# Patient Record
Sex: FEMALE | Race: WHITE | Employment: OTHER | URBAN - METROPOLITAN AREA
[De-identification: names, ages, dates, MRNs, and addresses within clinical notes are randomized per-mention and may not be internally consistent; named-entity substitution may affect disease eponyms.]

---

## 2022-10-13 ENCOUNTER — OFFICE VISIT (OUTPATIENT)
Dept: ORTHOPEDIC SURGERY | Age: 67
End: 2022-10-13
Payer: MEDICARE

## 2022-10-13 VITALS — BODY MASS INDEX: 24.34 KG/M2 | WEIGHT: 170 LBS | HEIGHT: 70 IN

## 2022-10-13 DIAGNOSIS — M77.8 RIGHT WRIST TENDINITIS: ICD-10-CM

## 2022-10-13 DIAGNOSIS — M25.531 RIGHT WRIST PAIN: Primary | ICD-10-CM

## 2022-10-13 PROCEDURE — G8420 CALC BMI NORM PARAMETERS: HCPCS | Performed by: FAMILY MEDICINE

## 2022-10-13 PROCEDURE — G8427 DOCREV CUR MEDS BY ELIG CLIN: HCPCS | Performed by: FAMILY MEDICINE

## 2022-10-13 PROCEDURE — 3017F COLORECTAL CA SCREEN DOC REV: CPT | Performed by: FAMILY MEDICINE

## 2022-10-13 PROCEDURE — 99203 OFFICE O/P NEW LOW 30 MIN: CPT | Performed by: FAMILY MEDICINE

## 2022-10-13 PROCEDURE — G8484 FLU IMMUNIZE NO ADMIN: HCPCS | Performed by: FAMILY MEDICINE

## 2022-10-13 PROCEDURE — 1123F ACP DISCUSS/DSCN MKR DOCD: CPT | Performed by: FAMILY MEDICINE

## 2022-10-13 PROCEDURE — 1090F PRES/ABSN URINE INCON ASSESS: CPT | Performed by: FAMILY MEDICINE

## 2022-10-13 PROCEDURE — G8400 PT W/DXA NO RESULTS DOC: HCPCS | Performed by: FAMILY MEDICINE

## 2022-10-13 PROCEDURE — 1036F TOBACCO NON-USER: CPT | Performed by: FAMILY MEDICINE

## 2022-10-13 PROCEDURE — 20550 NJX 1 TENDON SHEATH/LIGAMENT: CPT | Performed by: FAMILY MEDICINE

## 2022-10-13 RX ORDER — ATORVASTATIN CALCIUM 40 MG/1
40 TABLET, FILM COATED ORAL DAILY
COMMUNITY
Start: 2020-11-04

## 2022-10-13 RX ORDER — BETAMETHASONE SODIUM PHOSPHATE AND BETAMETHASONE ACETATE 3; 3 MG/ML; MG/ML
6 INJECTION, SUSPENSION INTRA-ARTICULAR; INTRALESIONAL; INTRAMUSCULAR; SOFT TISSUE ONCE
Status: COMPLETED | OUTPATIENT
Start: 2022-10-13 | End: 2022-10-13

## 2022-10-13 RX ORDER — BUPIVACAINE HYDROCHLORIDE 2.5 MG/ML
1 INJECTION, SOLUTION INFILTRATION; PERINEURAL ONCE
Status: COMPLETED | OUTPATIENT
Start: 2022-10-13 | End: 2022-10-13

## 2022-10-13 RX ORDER — LIDOCAINE HYDROCHLORIDE 10 MG/ML
1 INJECTION, SOLUTION INFILTRATION; PERINEURAL ONCE
Status: COMPLETED | OUTPATIENT
Start: 2022-10-13 | End: 2022-10-13

## 2022-10-13 RX ORDER — MELOXICAM 15 MG/1
15 TABLET ORAL DAILY
Qty: 30 TABLET | Refills: 3 | Status: SHIPPED | OUTPATIENT
Start: 2022-10-13

## 2022-10-13 RX ADMIN — BUPIVACAINE HYDROCHLORIDE 2.5 MG: 2.5 INJECTION, SOLUTION INFILTRATION; PERINEURAL at 13:18

## 2022-10-13 RX ADMIN — BETAMETHASONE SODIUM PHOSPHATE AND BETAMETHASONE ACETATE 6 MG: 3; 3 INJECTION, SUSPENSION INTRA-ARTICULAR; INTRALESIONAL; INTRAMUSCULAR; SOFT TISSUE at 13:17

## 2022-10-13 RX ADMIN — LIDOCAINE HYDROCHLORIDE 1 ML: 10 INJECTION, SOLUTION INFILTRATION; PERINEURAL at 13:18

## 2022-10-13 NOTE — PROGRESS NOTES
Chief Complaint    Hand Pain (N RIGHT HAND/)    Initial evaluation recurrent right radial wrist and forearm pain    History of Present Illness:  Ru Nieto is a 69 y.o. female very pleasant right-hand-dominant white female who previously worked with Lydia Obando for years and is a very nice patient of Dr. Jae Rojas who is being seen today upon self-referral for evaluation of recurrent pain to her right wrist and forearm. We have seen her about 15 years ago for de Quervain's tenosynovitis which responded very well to conservative treatment including a tendon sheath injection. She does workout 5 days/week and they were doing some heavier wrist workouts during one of her workout classes using 4 pound weights in which she was pronating and supinating and since that time has had more radial wrist and forearm discomfort. She felt it was similar to her de Quervain's and has been using her home splint taking low doses of ibuprofen and occasionally icing and modifying her workouts. Despite this her pain symptoms have persisted. She is not with great deal of swelling and there is been no associated numbness or tingling. Denies numbness tingling locking or catching. Mild soreness over her first [facility] joint where she know she has underlying osteoarthritis. She is being seen today for orthopedic and sports consultation with updated imaging. Pain Assessment  Location of Pain: Wrist  Location Modifiers: Right  Severity of Pain: 4  Quality of Pain: Aching, Dull  Duration of Pain: Persistent  Frequency of Pain: Constant  Aggravating Factors: Bending, Stretching, Straightening  Limiting Behavior: Some  Relieving Factors: Rest  Result of Injury: No  Work-Related Injury: No  Are there other pain locations you wish to document?: No    Medical History  Patient's medications, allergies, past medical, surgical, social and family histories were reviewed and updated as appropriate.     Review of Systems  Relevant review of systems reviewed on 10/13/2022 and available in the patient's chart under the media tab. Vital Signs  There were no vitals filed for this visit. General Exam:   Constitutional: Patient is adequately groomed with no evidence of malnutrition  DTRs: Deep tendon reflexes are intact  Mental Status: The patient is oriented to time, place and person. The patient's mood and affect are appropriate. Lymphatic: The lymphatic examination bilaterally reveals all areas to be without enlargement or induration. Vascular: Examination reveals no swelling or calf tenderness. Peripheral pulses are palpable and 2+. Neurological: The patient has good coordination. There is no weakness or sensory deficit. Wrist Examination    Inspection: No high-grade formally or substantial soft tissue swelling. She does appear to have some degenerative changes over the basilar thumb [facility] joint. Palpation: Clinical tenderness not in the classic area of the first dorsal compartment but is more in the second to the dorsal compartment a little bit more proximally without focal swelling. Mild discomfort over first [facility] joint. Rang of Motion: She does have reasonable wrist range of motion but does have pain with passive full flexion and ulnar deviation. Rates this at about a 4 out of 10. Strength: She does have some mild weakness 4-5 with wrist extension and radial deviation. Special Tests: Finkelstein's test does not appear to be overall with a positive but does have tenderness more over the distal second compartment. Negative Tinel's cubital carpal tunnel. Skin: There are no rashes, ulcerations or lesions. Distal motor sensory and vascular exams intact. Sensation: Distal sensory exam is intact    Circulation: Vascular exams intact    Gait:  Fluid smooth gait    Reflexes:  Symmetrically preserved    Additional Comments:     Additional Examinations:  Contralateral Exam: Contralateral left wrist exam is benign.   Right Upper Extremity:  Examination of the right upper extremity does not show any tenderness, deformity or injury. Range of motion is unremarkable. There is no gross instability. There are no rashes, ulcerations or lesions. Strength and tone are normal.  Left Upper Extremity: Examination of the left upper extremity does not show any tenderness, deformity or injury. Range of motion is unremarkable. There is no gross instability. There are no rashes, ulcerations or lesions. Strength and tone are normal.      Diagnostic Test Findings: Right wrist AP lateral bleak films were obtained today does not show evidence of obvious osseous injury to the distal radius. She does appear to have some mild degenerative change over the first [facility] joint. Assessment: #1.  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 Right wrist pain Yes       Office Procedures:  Orders Placed This Encounter   Procedures    XR WRIST RIGHT (MIN 3 VIEWS)     Standing Status:   Future     Number of Occurrences:   1     Standing Expiration Date:   10/13/2023       Treatment Plan:  Treatment options were discussed with Baljeet Abel. We did review her current plain films and exam findings. She has been experiencing pain more to the radial aspect of her wrist.  This does not have the classic appearance of de Quervain's tenosynovitis but seems to be more of a second compartment extensor overuse tendinitis. After discussing options, we did perform a second dorsal compartment steroid injection today using 1 cc of Celestone, 1 cc Marcaine, 1 cc of Xylocaine. She may utilize her home splint and we will start her on meloxicam 15 mg daily. We did review her wrist home-based exercises although potential incorporation of hand therapy was discussed if her initial treatment does not work.   Modification of her workouts avoiding repetitive wrist extension pronation and supination was discussed. Icing and activity modification was discussed. We will see her back in 4 weeks but she will contact us in the interim with questions or concerns.

## 2024-08-06 ENCOUNTER — OFFICE VISIT (OUTPATIENT)
Dept: ORTHOPEDIC SURGERY | Age: 69
End: 2024-08-06
Payer: MEDICARE

## 2024-08-06 VITALS — HEIGHT: 70 IN | BODY MASS INDEX: 26.34 KG/M2 | WEIGHT: 184 LBS

## 2024-08-06 DIAGNOSIS — M19.011 ARTHRITIS OF RIGHT SHOULDER REGION: ICD-10-CM

## 2024-08-06 DIAGNOSIS — M25.511 ACUTE PAIN OF RIGHT SHOULDER: Primary | ICD-10-CM

## 2024-08-06 DIAGNOSIS — M75.01 ADHESIVE CAPSULITIS OF RIGHT SHOULDER: ICD-10-CM

## 2024-08-06 PROCEDURE — 1123F ACP DISCUSS/DSCN MKR DOCD: CPT | Performed by: ORTHOPAEDIC SURGERY

## 2024-08-06 PROCEDURE — 1090F PRES/ABSN URINE INCON ASSESS: CPT | Performed by: ORTHOPAEDIC SURGERY

## 2024-08-06 PROCEDURE — 99203 OFFICE O/P NEW LOW 30 MIN: CPT | Performed by: ORTHOPAEDIC SURGERY

## 2024-08-06 PROCEDURE — 1036F TOBACCO NON-USER: CPT | Performed by: ORTHOPAEDIC SURGERY

## 2024-08-06 PROCEDURE — G8427 DOCREV CUR MEDS BY ELIG CLIN: HCPCS | Performed by: ORTHOPAEDIC SURGERY

## 2024-08-06 PROCEDURE — 3017F COLORECTAL CA SCREEN DOC REV: CPT | Performed by: ORTHOPAEDIC SURGERY

## 2024-08-06 PROCEDURE — G8400 PT W/DXA NO RESULTS DOC: HCPCS | Performed by: ORTHOPAEDIC SURGERY

## 2024-08-06 PROCEDURE — G8419 CALC BMI OUT NRM PARAM NOF/U: HCPCS | Performed by: ORTHOPAEDIC SURGERY

## 2024-08-06 PROCEDURE — 20610 DRAIN/INJ JOINT/BURSA W/O US: CPT | Performed by: ORTHOPAEDIC SURGERY

## 2024-08-06 RX ORDER — ATORVASTATIN CALCIUM 80 MG/1
80 TABLET, FILM COATED ORAL DAILY
COMMUNITY
Start: 2024-07-18

## 2024-08-06 RX ORDER — METHYLPREDNISOLONE ACETATE 40 MG/ML
80 INJECTION, SUSPENSION INTRA-ARTICULAR; INTRALESIONAL; INTRAMUSCULAR; SOFT TISSUE ONCE
Status: COMPLETED | OUTPATIENT
Start: 2024-08-06 | End: 2024-08-06

## 2024-08-06 RX ORDER — RANOLAZINE 500 MG/1
500 TABLET, EXTENDED RELEASE ORAL 2 TIMES DAILY
COMMUNITY
Start: 2024-06-17

## 2024-08-06 RX ADMIN — METHYLPREDNISOLONE ACETATE 80 MG: 40 INJECTION, SUSPENSION INTRA-ARTICULAR; INTRALESIONAL; INTRAMUSCULAR; SOFT TISSUE at 10:38

## 2024-08-06 RX ADMIN — Medication 4 ML: at 10:38

## 2024-08-06 NOTE — PROGRESS NOTES
Esme Louis is seen today for evaluation and treatment of right shoulder pain.  Her pain started about 6 weeks ago.  She does not recall any trauma.  It is getting stiff and she now has pain at night.  It wakes her.  If she takes ibuprofen that helps but the pain always comes back.  She is right-hand dominant    She works out regularly.  She has had a knee replacement but is otherwise very healthy other than high cholesterol and migraines.  She has been seen at [facility] for cervicalgia.    History: Patient's relevant past family, medical, and social history are reviewed as part of today's visit. ROS of pertinent positives and negatives as above; otherwise negative.    General Exam:    Vitals: Height 1.778 m (5' 10\"), weight 83.5 kg (184 lb).  Constitutional: Patient is adequately groomed with no evidence of malnutrition  Mental Status: The patient is oriented to time, place and person.  The patient's mood and affect are appropriate.  Gait:  Patient walks with normal gait and station.  Lymphatic: The lymphatic examination bilaterally reveals all areas to be without enlargement or induration.  Vascular: Examination reveals no swelling or calf tenderness.  Peripheral pulses are palpable and 2+.  Neurological: The patient has good coordination.  There is no weakness or sensory deficit.    Skin:    Head/Neck: inspection reveals no rashes, ulcerations or lesions.  Trunk:  inspection reveals no rashes, ulcerations or lesions.  Right Lower Extremity: inspection reveals no rashes, ulcerations or lesions.  Left Lower Extremity: inspection reveals no rashes, ulcerations or lesions.      Examination of the cervical spine reveals no restriction in motion.  There are no reproduction of symptoms into either arm with flexion, extension, rotation or palpation.  The patient has a negative Spurling sign, and no tenderness.    Examination of the left shoulder reveals normal scapular control and no prominence.  There is no pain

## 2024-08-13 ENCOUNTER — HOSPITAL ENCOUNTER (OUTPATIENT)
Dept: PHYSICAL THERAPY | Age: 69
Setting detail: THERAPIES SERIES
Discharge: HOME | End: 2024-08-13
Attending: ORTHOPAEDIC SURGERY
Payer: MEDICARE

## 2024-08-13 DIAGNOSIS — M25.511 ACUTE PAIN OF RIGHT SHOULDER: Primary | ICD-10-CM

## 2024-08-13 DIAGNOSIS — M25.60 DECREASED RANGE OF MOTION: ICD-10-CM

## 2024-08-13 PROCEDURE — 97161 PT EVAL LOW COMPLEX 20 MIN: CPT | Performed by: PHYSICAL THERAPIST

## 2024-08-13 PROCEDURE — 97110 THERAPEUTIC EXERCISES: CPT | Performed by: PHYSICAL THERAPIST

## 2024-08-13 PROCEDURE — 97112 NEUROMUSCULAR REEDUCATION: CPT | Performed by: PHYSICAL THERAPIST

## 2024-08-13 NOTE — PLAN OF CARE
[facility]- Outpatient Rehabilitation and Therapy [address], [place] [address] office: [phone] fax: [phone]     Physical Therapy Initial Evaluation Certification      Dear Nhan Ingram MD,    We had the pleasure of evaluating the following patient for physical therapy services at [facility].  A summary of our findings can be found in the initial assessment below.  This includes our plan of care.  If you have any questions or concerns regarding these findings, please do not hesitate to contact me at the office phone number listed above.  Thank you for the referral.     Physician Signature:_______________________________Date:__________________  By signing above (or electronic signature), therapist’s plan is approved by physician       Physical Therapy: TREATMENT/PROGRESS NOTE   Patient: Esme Louis (69 y.o. female)   Examination Date: 2024   :  1955 MRN: [id number]   Visit #: 1   Insurance Allowable Auth Needed   BMN []Yes    []No    Insurance: Payor: MEDICARE / Plan: MEDICARE PART A AND B / Product Type: *No Product type* /   Insurance ID: [id number] - (Medicare)  Secondary Insurance (if applicable): [employer]   Treatment Diagnosis:     ICD-10-CM    1. Acute pain of right shoulder  M25.511       2. Decreased range of motion  M25.60          Medical Diagnosis:  Acute pain of right shoulder [M25.511]  Arthritis of right shoulder region [M19.011]  Adhesive capsulitis of right shoulder [M75.01]   Referring Physician: Nhan Ingram MD  PCP: Rtia Pascual MD     Plan of care signed (Y/N):     Date of Patient follow up with Physician:      Plan of Care Report: EVAL today  POC update due: (10 visits /OR AUTH LIMITS, whichever is less) 10 Sept 2024                                            Medical History:  Comorbidities:  Osteoporosis/Osteopenia  Osteoarthritis  Prior Surgeries: TKA - 2016 and 2018, lumbar

## 2024-08-20 ENCOUNTER — OFFICE VISIT (OUTPATIENT)
Dept: ORTHOPEDIC SURGERY | Age: 69
End: 2024-08-20
Payer: MEDICARE

## 2024-08-20 VITALS — BODY MASS INDEX: 26.34 KG/M2 | HEIGHT: 70 IN | WEIGHT: 184 LBS

## 2024-08-20 DIAGNOSIS — M19.011 ARTHRITIS OF RIGHT SHOULDER REGION: Primary | ICD-10-CM

## 2024-08-20 PROCEDURE — 99212 OFFICE O/P EST SF 10 MIN: CPT | Performed by: ORTHOPAEDIC SURGERY

## 2024-08-20 PROCEDURE — 1090F PRES/ABSN URINE INCON ASSESS: CPT | Performed by: ORTHOPAEDIC SURGERY

## 2024-08-20 PROCEDURE — 3017F COLORECTAL CA SCREEN DOC REV: CPT | Performed by: ORTHOPAEDIC SURGERY

## 2024-08-20 PROCEDURE — G8400 PT W/DXA NO RESULTS DOC: HCPCS | Performed by: ORTHOPAEDIC SURGERY

## 2024-08-20 PROCEDURE — 1123F ACP DISCUSS/DSCN MKR DOCD: CPT | Performed by: ORTHOPAEDIC SURGERY

## 2024-08-20 PROCEDURE — G8427 DOCREV CUR MEDS BY ELIG CLIN: HCPCS | Performed by: ORTHOPAEDIC SURGERY

## 2024-08-20 PROCEDURE — G8419 CALC BMI OUT NRM PARAM NOF/U: HCPCS | Performed by: ORTHOPAEDIC SURGERY

## 2024-08-20 PROCEDURE — 1036F TOBACCO NON-USER: CPT | Performed by: ORTHOPAEDIC SURGERY

## 2024-08-20 NOTE — PROGRESS NOTES
Esme Louis returns today to follow-up her right shoulder.  We did an injection about 2 weeks ago.    Today she reports dramatic improvement.  She rates her pain a 0 out of 10.  She says therapy can sometimes make her sore but it does not cause pain.    General Exam:    Vitals: Height 1.778 m (5' 10\"), weight 83.5 kg (184 lb).  Constitutional: Patient is adequately groomed with no evidence of malnutrition  Mental Status: The patient is oriented to time, place and person.  The patient's mood and affect are appropriate.    Right shoulder is completely benign today.  She has no tenderness.  She has full strength and normal motion.    Assessment: Right shoulder responded nicely to cortisone and therapy.    Plan: She will continue with her home-based rehab exercises with stretching each day and strengthening no more frequently than every other day.  She understands we can repeat injections not sooner than every 3 months.  Follow-up with me on an as-needed basis

## 2024-08-21 ENCOUNTER — HOSPITAL ENCOUNTER (OUTPATIENT)
Dept: PHYSICAL THERAPY | Age: 69
Setting detail: THERAPIES SERIES
Discharge: HOME | End: 2024-08-21
Attending: ORTHOPAEDIC SURGERY
Payer: MEDICARE

## 2024-08-21 PROCEDURE — 97110 THERAPEUTIC EXERCISES: CPT | Performed by: PHYSICAL THERAPIST

## 2024-08-21 NOTE — DISCHARGE SUMMARY
[facility]- Outpatient Rehabilitation and Therapy [address], [place] [address] office: [phone] fax: [phone]      Physical Therapy Discharge Summary    Dear Dr. Ingram,    We had the pleasure of treating the following patient for physical therapy services at [facility].  A summary of our findings can be found in the discharge summary below.  If you have any questions or concerns regarding these findings, please do not hesitate to contact me at the office phone number checked above.  Thank you for the referral.     Physician Signature:________________________________Date:__________________  By signing above (or electronic signature), therapist’s plan is approved by physician      Overall Response to Treatment:   [x]Patient is responding well to treatment and improvement is noted with regards  to goals   []Patient should continue to improve in reasonable time if they continue HEP   []Patient has plateaued and is no longer responding to skilled PT intervention    []Patient is getting worse and would benefit from return to referring MD   []Patient unable to adhere to initial POC   []Other: Patient returns to PT with no symptoms. She demonstrates improved functional R shoulder ROM and improved strength. She has returned to her workout classes pain free. She is independent with her HEP and was educated on the importance of continuing her HEP 3x/ week. Therefore, pt is being D/C to HEP at this time. She was educated to call with any questions/ concerns.    Date range of Visits: 24 - 24    Total Visits: 2      Physical Therapy: TREATMENT/PROGRESS NOTE   Patient: Esme Louis (69 y.o. female)   Examination Date: 2024   :  1955 MRN: [id number]   Visit #: 2   Insurance Allowable Auth Needed   BMN []Yes    []No    Insurance: Payor: MEDICARE / Plan: MEDICARE PART A AND B / Product Type: *No Product type* /   Insurance ID: [id number] -

## 2024-09-05 ENCOUNTER — PATIENT MESSAGE (OUTPATIENT)
Dept: ORTHOPEDIC SURGERY | Age: 69
End: 2024-09-05

## 2024-09-05 DIAGNOSIS — M19.011 ARTHRITIS OF RIGHT SHOULDER REGION: Primary | ICD-10-CM

## 2024-09-05 RX ORDER — MELOXICAM 7.5 MG/1
7.5 TABLET ORAL DAILY
Qty: 30 TABLET | Refills: 1 | Status: SHIPPED | OUTPATIENT
Start: 2024-09-05

## 2024-09-05 NOTE — TELEPHONE ENCOUNTER
Per Dr. Ingram, Meloxicam sent to pharmacy. Patient will only do exercises that physical therapy gave her. She will contact us in a few weeks for a MRI if no improvement.

## 2024-10-07 ENCOUNTER — TELEPHONE (OUTPATIENT)
Dept: ORTHOPEDIC SURGERY | Age: 69
End: 2024-10-07

## 2024-10-07 DIAGNOSIS — M75.01 ADHESIVE CAPSULITIS OF RIGHT SHOULDER: Primary | ICD-10-CM

## 2024-10-07 DIAGNOSIS — M25.511 ACUTE PAIN OF RIGHT SHOULDER: ICD-10-CM

## 2024-10-07 DIAGNOSIS — M19.011 ARTHRITIS OF RIGHT SHOULDER REGION: ICD-10-CM

## 2024-10-07 NOTE — TELEPHONE ENCOUNTER
Per Dr. Ingram, order MRI and refill Meloxicam. Called and spoke with patient. She states that she has a refill left on her Meloxicam that she will fill. Patient scheduled for MRI and follow up appointment.

## 2024-10-14 ENCOUNTER — OFFICE VISIT (OUTPATIENT)
Dept: ORTHOPEDIC SURGERY | Age: 69
End: 2024-10-14
Payer: MEDICARE

## 2024-10-14 VITALS — BODY MASS INDEX: 26.34 KG/M2 | WEIGHT: 184 LBS | RESPIRATION RATE: 12 BRPM | HEIGHT: 70 IN

## 2024-10-14 DIAGNOSIS — M19.011 ARTHRITIS OF RIGHT SHOULDER REGION: Primary | ICD-10-CM

## 2024-10-14 PROCEDURE — G8400 PT W/DXA NO RESULTS DOC: HCPCS | Performed by: ORTHOPAEDIC SURGERY

## 2024-10-14 PROCEDURE — G8419 CALC BMI OUT NRM PARAM NOF/U: HCPCS | Performed by: ORTHOPAEDIC SURGERY

## 2024-10-14 PROCEDURE — 1123F ACP DISCUSS/DSCN MKR DOCD: CPT | Performed by: ORTHOPAEDIC SURGERY

## 2024-10-14 PROCEDURE — G8427 DOCREV CUR MEDS BY ELIG CLIN: HCPCS | Performed by: ORTHOPAEDIC SURGERY

## 2024-10-14 PROCEDURE — 1090F PRES/ABSN URINE INCON ASSESS: CPT | Performed by: ORTHOPAEDIC SURGERY

## 2024-10-14 PROCEDURE — 99212 OFFICE O/P EST SF 10 MIN: CPT | Performed by: ORTHOPAEDIC SURGERY

## 2024-10-14 PROCEDURE — 3017F COLORECTAL CA SCREEN DOC REV: CPT | Performed by: ORTHOPAEDIC SURGERY

## 2024-10-14 PROCEDURE — 1036F TOBACCO NON-USER: CPT | Performed by: ORTHOPAEDIC SURGERY

## 2024-10-14 PROCEDURE — G8484 FLU IMMUNIZE NO ADMIN: HCPCS | Performed by: ORTHOPAEDIC SURGERY

## 2024-10-14 NOTE — PROGRESS NOTES
Esme Louis returns today to follow-up her right shoulder MRI scan.    Meloxicam is helpful and pain is only 2 out of 10 most of the time but she does struggle with some pain in the evening.    General Exam:    Vitals: Resp. rate 12, height 1.778 m (5' 10\"), weight 83.5 kg (184 lb).  Constitutional: Patient is adequately groomed with no evidence of malnutrition  Mental Status: The patient is oriented to time, place and person.  The patient's mood and affect are appropriate.    Right shoulder has some mild tenderness anteriorly but no AC joint pain.  She can forward elevate to 170 degrees with discomfort in elevation and abduction.          MRI right shoulder is reviewed and demonstrates:    Exam Date: 10/10/2024   Exam Description: MR Right Shoulder joint w/o Contrast   HISTORY: Shoulder pain.  Adhesive capsulitis.   TECHNICAL FACTORS: Long- and short-axis fat- and water-weighted images were performed.   COMPARISON: None.   FINDINGS: AC arthropathy.  Low lying acromion.   Moderate cuff tendinosis and hypertrophy.  Frayed cuff insertions.  Mild bursitis.  Pseudocysts greater tuberosity.   Biceps arcuate segment is tendinopathic.   Glenohumeral osteoarthropathy.  Worn superior labrum.  Intermediate grade chondromalacia.  Complex effusion.  Synovial debris within the capsule and biceps sheath.   No soft tissue mass.   CONCLUSION:   1. Cuff tendinosis and hypertrophy.  Frayed cuff insertions.  Mild bursitis.   2. Glenohumeral arthropathy.  Worn labrum.  Intermediate grade chondromalacia.  Complex effusion.  Synovial debris within the capsule and biceps sheath.   3. AC arthropathy.       Assessment: Discomfort from right shoulder arthritis.    Plan: We discussed different options in the evening including taking her meloxicam with dinner, melatonin, and Tylenol.  We discussed the option of an injection if she has ongoing symptoms despite modifying her medication use.  We also discussed the option of a course of oral